# Patient Record
(demographics unavailable — no encounter records)

---

## 2024-12-02 NOTE — HISTORY OF PRESENT ILLNESS
[FreeTextEntry1] : CHIEF COMPLAINT: Thyroid cancer Surgeon Dr. Mo Kumar  HISTORY OF PRESENTING ILLNESS: The patient is a 30-year-old female being seen in the office today for evaluation of thyroid cancer, postsurgical hypothyroidism.   She was initially noted to have a left-sided thyroid nodule palpated by PCP in April 2023.  Thyroid ultrasound showed a 2.9 cm left-sided thyroid nodule, FNA was [place] 4 with Afirma positive, 50% risk of malignancy.  Subsequent evaluation with CT neck showed left level 2 and left level 3 lymph nodes suspicious for litzy mets, FNA was positive for malignancy for both lymph nodes.  Surgery: Total thyroidectomy, left lateral neck dissection 8/23/2023, Dr. Mo Kumar Multifocal PTC, infiltrative follicular variant. Left PTC 2.2 cm, right PTC 0.5 cm.  Lymphatic invasion present. No angioinvasion, no ETE, margins negative, no increased mitoses or tumor necrosis. 3/25 LN positive for metastatic disease, left level 2 and level 3, largest 1.5 cm, no BERNADINE. 2015 HOLGER Risk: Intermediate AJCC 8th edition TNM Stage: [id number], Stage I HINDS Treatment: 101.4 mCi 11/3/2023  Surveillance:  HINDS treatment 101.4 mCi on 11/3/2023.  Posttherapy scan with iodine avid right thyroid bed remnant and left level 4 iodine avid lateral compartment disease, no distant iodine avid disease. Thyroglobulin <0.2 with negative TgAb, most recently 11/25/2024. Neck US 4/25/24 with 2.2 cm probable right thyroid remnant, no suspicious cervical LN. Neck US 11/8/2024: [facility]  Postsurgical Hypothyroidism:  She is currently taking 100 mcg daily of levothyroxine x 6 tablets/week. Reports compliance with medication.  She is taking it appropriately, on an empty stomach at least 30 minutes before food.  Surgical site has been healing well, voice is normal. Denies any palpitations, tremors, heat or cold intolerance, constipation or diarrhea.  No dysphagia, no lower extremity swelling.  Endorsing some hair loss.  No family history of thyroid cancer or thyroid disease.  No personal history of radiation exposure to the head and neck area.

## 2024-12-02 NOTE — PHYSICAL EXAM
[TextEntry] : PHYSICAL EXAMINATION: Vital signs from today's encounter reviewed.  GENERAL: No acute distress, clinically eukinetic, normal appearance NECK:, No swelling, incision site healing well, non-tender, no adenopathy RESPIRATORY: normal chest expansion with good pulmonary effort, no acute respiratory distress NEUROLOGIC: alert and oriented, no evident focal deficits, no tremors  PSYCHIATRIC: mood and affect are normal

## 2025-06-27 NOTE — PHYSICAL EXAM
[TextEntry] : PHYSICAL EXAMINATION: Vital signs from today's encounter reviewed.  GENERAL: No acute distress, clinically eukinetic, normal appearance NECK:, No swelling, incision site healed well, non-tender, no adenopathy.  Redness and hypertrophy around incision site. RESPIRATORY: normal chest expansion with good pulmonary effort, no acute respiratory distress NEUROLOGIC: alert and oriented, no evident focal deficits, no tremors  PSYCHIATRIC: mood and affect are normal

## 2025-06-27 NOTE — HISTORY OF PRESENT ILLNESS
[FreeTextEntry1] : CHIEF COMPLAINT: Thyroid cancer Surgeon Dr. Mo Kumar  HISTORY OF PRESENTING ILLNESS: The patient is a 30-year-old female being seen in the office today for evaluation of thyroid cancer, postsurgical hypothyroidism.   She was initially noted to have a left-sided thyroid nodule palpated by PCP in April 2023.  Thyroid ultrasound showed a 2.9 cm left-sided thyroid nodule, FNA was [place] 4 with Afirma positive, 50% risk of malignancy.  Subsequent evaluation with CT neck showed left level 2 and left level 3 lymph nodes suspicious for litzy mets, FNA was positive for malignancy for both lymph nodes.  Surgery: Total thyroidectomy, left lateral neck dissection 8/23/2023, Dr. Mo Kumar Multifocal PTC, infiltrative follicular variant. Left PTC 2.2 cm, right PTC 0.5 cm.  Lymphatic invasion present. No angioinvasion, no ETE, margins negative, no increased mitoses or tumor necrosis. 3/25 LN positive for metastatic disease, left level 2 and level 3, largest 1.5 cm, no BERNADINE. 2015 HOLGER Risk: Intermediate AJCC 8th edition TNM Stage: [id number], Stage I HINDS Treatment: 101.4 mCi 11/3/2023  Surveillance:  HINDS treatment 101.4 mCi on 11/3/2023.  Posttherapy scan with iodine avid right thyroid bed remnant and left level 4 iodine avid lateral compartment disease, no distant iodine avid disease. Thyroglobulin has been undetectable/very low with negative TgAb, most recent 6/21/2025 TG 0.4, TG AB 24.4. Neck US 4/25/24 with 2.2 cm probable right thyroid remnant, no suspicious cervical LN. Neck US 11/8/2024: [facility]  Postsurgical Hypothyroidism:  She is currently taking 88 mcg daily. Reports compliance with medication.  She tells me that she is not necessarily waiting 30 minutes before eating after taking the medication. Labs from 6/21/2025: TSH 11.6 elevated, free T4 normal 1.1. Symptoms 6/27/2025: She has some redness around her incision site even now, has a hypertrophic scar underneath.  Reports energy levels are good, appetite and sleep are good.  No constipation or diarrhea, no palpitations or tremors, no heat or cold intolerance, no lower extremity swelling.  Weight has been stable.  No neck swelling, no dysphagia or dyspnea or change in voice.  No family history of thyroid cancer or thyroid disease.  No personal history of radiation exposure to the head and neck area.   No active plans for pregnancy at this time.